# Patient Record
Sex: FEMALE | Race: WHITE | NOT HISPANIC OR LATINO | Employment: PART TIME | URBAN - METROPOLITAN AREA
[De-identification: names, ages, dates, MRNs, and addresses within clinical notes are randomized per-mention and may not be internally consistent; named-entity substitution may affect disease eponyms.]

---

## 2019-06-23 ENCOUNTER — HOSPITAL ENCOUNTER (EMERGENCY)
Facility: HOSPITAL | Age: 59
Discharge: HOME | End: 2019-06-23
Attending: EMERGENCY MEDICINE
Payer: COMMERCIAL

## 2019-06-23 ENCOUNTER — APPOINTMENT (OUTPATIENT)
Dept: RADIOLOGY | Facility: HOSPITAL | Age: 59
End: 2019-06-23
Payer: COMMERCIAL

## 2019-06-23 VITALS
BODY MASS INDEX: 36.72 KG/M2 | TEMPERATURE: 98.2 F | SYSTOLIC BLOOD PRESSURE: 138 MMHG | DIASTOLIC BLOOD PRESSURE: 83 MMHG | HEART RATE: 112 BPM | OXYGEN SATURATION: 95 % | WEIGHT: 199.52 LBS | HEIGHT: 62 IN | RESPIRATION RATE: 18 BRPM

## 2019-06-23 DIAGNOSIS — R05.9 COUGH: Primary | ICD-10-CM

## 2019-06-23 DIAGNOSIS — J98.01 ACUTE BRONCHOSPASM: ICD-10-CM

## 2019-06-23 DIAGNOSIS — J06.9 URI (UPPER RESPIRATORY INFECTION): ICD-10-CM

## 2019-06-23 PROCEDURE — 99284 EMERGENCY DEPT VISIT MOD MDM: CPT | Performed by: EMERGENCY MEDICINE

## 2019-06-23 PROCEDURE — 71045 X-RAY EXAM CHEST 1 VIEW: CPT

## 2019-06-23 PROCEDURE — 6370000100 HC RX 637 (ALT 250 FOR IP): Performed by: EMERGENCY MEDICINE

## 2019-06-23 PROCEDURE — 94640 AIRWAY INHALATION TREATMENT: CPT

## 2019-06-23 RX ORDER — PROMETHAZINE HYDROCHLORIDE AND CODEINE PHOSPHATE 6.25; 1 MG/5ML; MG/5ML
5 SOLUTION ORAL EVERY 4 HOURS PRN
Qty: 240 ML | Refills: 0 | Status: SHIPPED | OUTPATIENT
Start: 2019-06-23

## 2019-06-23 RX ORDER — LEVALBUTEROL TARTRATE 45 UG/1
2 AEROSOL, METERED ORAL ONCE
Status: COMPLETED | OUTPATIENT
Start: 2019-06-23 | End: 2019-06-23

## 2019-06-23 RX ADMIN — LEVALBUTEROL TARTRATE 2 PUFF: 45 AEROSOL, METERED ORAL at 20:06

## 2019-06-24 NOTE — ED PROVIDER NOTES
Cough (cough x 2 days, not improving. )        HPI:    Patient is a 59 y.o. female presenting to the emergency department with a severe, productive cough for the past 2 days. She reports clear sputum. Patient denies any fever, rhinorrhea or abdominal pain. She has a history of bronchitis. She has used an inhaler with no relief.    No past medical history on file.    Past Surgical History:   Procedure Laterality Date   • APPENDECTOMY     • FRACTURE SURGERY         Social History     Socioeconomic History   • Marital status:      Spouse name: Not on file   • Number of children: Not on file   • Years of education: Not on file   • Highest education level: Not on file   Occupational History   • Not on file   Social Needs   • Financial resource strain: Not on file   • Food insecurity:     Worry: Not on file     Inability: Not on file   • Transportation needs:     Medical: Not on file     Non-medical: Not on file   Tobacco Use   • Smoking status: Never Smoker   Substance and Sexual Activity   • Alcohol use: Yes   • Drug use: Never   • Sexual activity: Defer   Lifestyle   • Physical activity:     Days per week: Not on file     Minutes per session: Not on file   • Stress: Not on file   Relationships   • Social connections:     Talks on phone: Not on file     Gets together: Not on file     Attends Jew service: Not on file     Active member of club or organization: Not on file     Attends meetings of clubs or organizations: Not on file     Relationship status: Not on file   • Intimate partner violence:     Fear of current or ex partner: Not on file     Emotionally abused: Not on file     Physically abused: Not on file     Forced sexual activity: Not on file   Other Topics Concern   • Not on file   Social History Narrative   • Not on file       No family history on file.    No Known Allergies      Current Outpatient Medications:   •  promethazine-codeine (PHENERGAN with CODEINE) 6.25-10 mg/5 mL syrup, Take 5 mL by  mouth every 4 (four) hours as needed for cough Max Daily Amount: 30 mL, Disp: 240 mL, Rfl: 0      ROS:  Constitutional: Negative for fever.   HENT: Negative for sore throat.    Eyes: Negative for pain.   Respiratory: Negative for shortness of breath, positive for cough.    Cardiovascular: Negative for chest pain.   Gastrointestinal: Negative for abdominal pain.   Endocrine: Negative for polyuria.   Genitourinary: Negative for flank pain.   Musculoskeletal: Negative for back pain.   Neurological: Negative for headaches.   Hematological: Negative for bleeding.       ED Triage Vitals   Temp Heart Rate Resp BP SpO2   06/23/19 1945 06/23/19 1945 06/23/19 1945 06/23/19 1945 06/23/19 1945   36.8 °C (98.2 °F) 124 20 156/86 91 %      Temp Source Heart Rate Source Patient Position BP Location FiO2 (%)   06/23/19 1944 -- 06/23/19 2002 -- --   Oral  Sitting           Physical Exam:  Nursing note and vitals reviewed.  Constitutional: appears well-developed. Mild distress.   HENT: Normal.   Head: Normocephalic and atraumatic.   Eyes: Pupils are equal, round, and reactive to light.   Neck: Supple, no lymphadenopathy  Cardiovascular: Tachycardic. Normal pulses.  Pulmonary/Chest: No respiratory distress.  Clear to auscultation bilaterally.  Abdominal: Soft and nontender.    Back: No CVA tenderness.  Musculoskeletal: No edema  Neurological: Alert and oriented.  No gross weakness.  Skin: Skin is warm and dry. No rash noted.   Psychiatric: Normal mood and affect.       Labs Reviewed - No data to display    XR chest portable 1 view   Final Result   IMPRESSION:       1. Patchy atelectasis at the lung bases, possible small right pleural effusion.                   MDM:    Patient presents with cough. Old records reviewed. Comprehensive evaluation performed in the emergency department today. Continuous cardiac and respiratory monitoring were performed in the ED. Nursing notes were reviewed along with imaging studies. No evidence of  pneumonia. Findings consistent with upper respiratory infection. An Albuterol inhaler and Phenergan with codeine cough syrup were provided along with a work note. Results were discussed with the patient and she was comfortable with outpatient follow-up. Patient was stable for discharge home. Return instructions were given.    Procedures        Clinical Impression:  Final diagnoses:   [R05] Cough   [J06.9] URI (upper respiratory infection)   [J98.01] Acute bronchospasm     By signing my name, I, John Medina, attest that this documentation has been prepared under the direction and in the presence of Dr. Tian, 6/23/2019, 7:51 PM.    I, Dr. Brad Tian, personally performed the services described in this documentation as typed by the scribe while in my presence and it is both accurate and complete.     A voice recognition program was used to aid in the documentation of this record. Sometimes words are not printed exactly as they were spoken. While efforts were made to carefully edit and correct any inaccuracies, some errors may be present; please take these into context. Please contact the provider if errors are identified.      Brad Tian MD  06/23/19 [id number]

## 2019-06-26 ENCOUNTER — ANCILLARY PROCEDURE (OUTPATIENT)
Dept: RADIOLOGY | Facility: URGENT CARE | Age: 59
End: 2019-06-26
Payer: COMMERCIAL

## 2019-06-26 ENCOUNTER — OFFICE VISIT (OUTPATIENT)
Dept: URGENT CARE | Facility: URGENT CARE | Age: 59
End: 2019-06-26
Payer: COMMERCIAL

## 2019-06-26 VITALS
DIASTOLIC BLOOD PRESSURE: 70 MMHG | HEART RATE: 114 BPM | SYSTOLIC BLOOD PRESSURE: 112 MMHG | OXYGEN SATURATION: 90 % | RESPIRATION RATE: 18 BRPM | TEMPERATURE: 99.5 F | BODY MASS INDEX: 35.7 KG/M2 | WEIGHT: 194 LBS | HEIGHT: 62 IN

## 2019-06-26 DIAGNOSIS — R05.9 COUGH: Primary | ICD-10-CM

## 2019-06-26 DIAGNOSIS — J20.9 ACUTE BRONCHITIS, UNSPECIFIED ORGANISM: ICD-10-CM

## 2019-06-26 PROCEDURE — 99213 OFFICE O/P EST LOW 20 MIN: CPT | Performed by: PHYSICIAN ASSISTANT

## 2019-06-26 PROCEDURE — 71046 X-RAY EXAM CHEST 2 VIEWS: CPT | Mod: TC | Performed by: PHYSICIAN ASSISTANT

## 2019-06-26 RX ORDER — MULTIVITAMIN
1 TABLET ORAL DAILY
COMMUNITY

## 2019-06-26 RX ORDER — NAPROXEN SODIUM 220 MG/1
81 TABLET, FILM COATED ORAL DAILY
COMMUNITY

## 2019-06-26 RX ORDER — ASCORBIC ACID 500 MG
500 TABLET ORAL DAILY
COMMUNITY

## 2019-06-26 RX ORDER — ALBUTEROL SULFATE 90 UG/1
2 INHALANT RESPIRATORY (INHALATION) EVERY 6 HOURS PRN
COMMUNITY

## 2019-06-26 RX ORDER — CETIRIZINE HYDROCHLORIDE 10 MG/1
10 TABLET ORAL DAILY
COMMUNITY

## 2019-06-26 RX ORDER — ATORVASTATIN CALCIUM 20 MG/1
TABLET, FILM COATED ORAL
COMMUNITY
Start: 2019-06-03

## 2019-06-26 RX ORDER — DOXYCYCLINE 100 MG/1
100 CAPSULE ORAL 2 TIMES DAILY
Qty: 20 CAPSULE | Refills: 0 | Status: SHIPPED | OUTPATIENT
Start: 2019-06-26 | End: 2019-07-06

## 2019-06-26 ASSESSMENT — PAIN SCALES - GENERAL: PAINLEVEL: 0-NO PAIN

## 2019-06-26 NOTE — LETTER
06/26/19       [facility]  [address] [address]  [phone]  Dept: [phone]        To Whom it May Concern:    Mary Jo Shea was evaluated at [facility] on 6/26/2019.  She may return on 6/28/2019.    If you have any questions or concerns, please don't hesitate to call.        Sincerely,       AVIVA Wilson

## 2019-06-26 NOTE — PROGRESS NOTES
"Subjective      HPI    Mary Jo Shea is a 59 y.o. female who presents for worsening cough.  This started Sunday.  She was seen in the emergency room Sunday evening was given Phenergan with codeine and levalbuterol.  She has been using the inhaler every 4 hours.  She does have some chills she has had no known fevers cough is nonproductive she does have some congestion in the left ear.      Review of Systems    As noted in HPI.      Objective   /70 (BP Location: Left arm, Patient Position: Sitting, Cuff Size: Reg) Comment: Manual  Pulse 114   Temp 37.5 °C (99.5 °F) (Temporal)   Resp 18   Ht 1.575 m (5' 2\")   Wt 88 kg (194 lb)   SpO2 90%   BMI 35.48 kg/m²     Physical Exam   Constitutional: She is oriented to person, place, and time. She appears well-developed and well-nourished.   HENT:   Head: Normocephalic and atraumatic.   Eyes: Pupils are equal, round, and reactive to light. Conjunctivae are normal.   Neck: Normal range of motion. Neck supple.   Cardiovascular: Normal rate, regular rhythm and normal heart sounds.   Pulmonary/Chest: Effort normal. She has wheezes ( Diffuse).   Musculoskeletal: Normal range of motion.   Neurological: She is alert and oriented to person, place, and time.   Skin: Skin is warm and dry.   Psychiatric: She has a normal mood and affect. Her behavior is normal.   Nursing note and vitals reviewed.      No results found for this or any previous visit (from the past 4 hour(s)).  X-ray Chest 2 Views    Result Date: 6/26/2019  Exam: Chest x-ray, 2 views 06/26/2019 . Clinical History: Cough; Worsening cough shortness of breath Comparison(s):  6/23/2019 Findings:  Patchy airspace disease at both lung bases may be slightly increased since previous. No significant effusion. Cardiac size is within normal limits. No acute osseous findings.     IMPRESSION: 1. Slight interval increase in basilar lung opacities which may represent worsening atelectasis or interstitial " infiltrate.          Assessment/Plan   Diagnoses and all orders for this visit:    Cough  -     X-ray chest 2 views  -     doxycycline (MONODOX) 100 mg capsule; Take 1 capsule (100 mg total) by mouth 2 (two) times a day for 10 days    Acute bronchitis, unspecified organism        Discussion:   Chest x-ray is slightly worse and Sunday we will go ahead and add some doxycycline and she will continue with the albuterol inhaler every 4 hours we will have her use the cough syrup with codeine at at bedtime only she may use some plain Robitussin during the day push fluids to get some rest and follow-up as needed patient understands agrees to plan of care.       AVIVA Wilson